# Patient Record
Sex: MALE | Race: BLACK OR AFRICAN AMERICAN | NOT HISPANIC OR LATINO | Employment: OTHER | URBAN - METROPOLITAN AREA
[De-identification: names, ages, dates, MRNs, and addresses within clinical notes are randomized per-mention and may not be internally consistent; named-entity substitution may affect disease eponyms.]

---

## 2022-10-30 ENCOUNTER — HOSPITAL ENCOUNTER (EMERGENCY)
Facility: HOSPITAL | Age: 52
Discharge: HOME | End: 2022-10-30
Attending: EMERGENCY MEDICINE
Payer: COMMERCIAL

## 2022-10-30 VITALS
DIASTOLIC BLOOD PRESSURE: 79 MMHG | TEMPERATURE: 98 F | SYSTOLIC BLOOD PRESSURE: 173 MMHG | OXYGEN SATURATION: 98 % | RESPIRATION RATE: 18 BRPM | HEART RATE: 60 BPM | WEIGHT: 292 LBS

## 2022-10-30 DIAGNOSIS — E66.01 MORBID OBESITY: ICD-10-CM

## 2022-10-30 DIAGNOSIS — I87.2 VENOUS STASIS DERMATITIS OF RIGHT LOWER EXTREMITY: Primary | ICD-10-CM

## 2022-10-30 DIAGNOSIS — I10 CHRONIC HYPERTENSION: ICD-10-CM

## 2022-10-30 PROCEDURE — 99281 EMR DPT VST MAYX REQ PHY/QHP: CPT | Mod: 25

## 2022-10-30 NOTE — ED PROVIDER NOTES
SCRIBE #1 NOTE: I, Snehal Britt, am scribing for, and in the presence of, Susan Camarillo MD. I have scribed the entire note.       History     Chief Complaint   Patient presents with    Leg Swelling     Pt. Reports leg swelling to the right leg for the last 2 weeks. Redness, swelling, and pain to the foot, and calf area.      Review of patient's allergies indicates:  No Known Allergies      History of Present Illness     HPI    10/30/2022, 12:44 PM  History obtained from the patient      History of Present Illness: Kavin Phillip is a 52 y.o. male patient who presents to the Emergency Department for evaluation of R leg swelling x2 wks. Pt reports being hospitalized for cellulitis of RLE  and fungus between toes on R foot on 09/19. Reports having an US done at this time, also on blood thinners for atrial fib. Symptoms are constant and moderate in severity. No mitigating or exacerbating factors reported. Patient denies any SOB, CP, palpitations, weakness, fever, chills, and all other sxs at this time.  No further complaints or concerns at this time.       Arrival mode: Personal vehicle      PCP: No primary care provider on file.        Past Medical History:  Past Medical History:   Diagnosis Date    Hypertension        Past Surgical History:  History reviewed. No pertinent surgical history.      Family History:  History reviewed. No pertinent family history.    Social History:  Social History     Tobacco Use    Smoking status: Not on file    Smokeless tobacco: Not on file   Substance and Sexual Activity    Alcohol use: Not on file    Drug use: Not on file    Sexual activity: Not on file        Review of Systems     Review of Systems   Constitutional:  Negative for chills and fever.   HENT:  Negative for sore throat.    Respiratory:  Negative for shortness of breath.    Cardiovascular:  Positive for leg swelling (R). Negative for chest pain.   Gastrointestinal:  Negative for abdominal pain and nausea.    Genitourinary:  Negative for dysuria.   Musculoskeletal:  Negative for back pain.   Skin:  Negative for rash.   Neurological:  Negative for weakness.   Hematological:  Does not bruise/bleed easily.   All other systems reviewed and are negative.     Physical Exam     Initial Vitals [10/30/22 1222]   BP Pulse Resp Temp SpO2   (!) 200/105 68 18 97.5 °F (36.4 °C) 98 %      MAP       --          Physical Exam  Nursing Notes and Vital Signs Reviewed.  Constitutional: Patient is in no apparent distress. Well-developed and well-nourished.  Head: Atraumatic. Normocephalic.  Eyes: PERRL. EOM intact. Conjunctivae are not pale. No scleral icterus.  ENT: Mucous membranes are moist. Oropharynx is clear and symmetric.    Neck: Supple. Full ROM. No lymphadenopathy.  Cardiovascular: Regular rate. Regular rhythm. No murmurs, rubs, or gallops. Distal pulses are 2+ and symmetric.  Pulmonary/Chest: No respiratory distress. Clear to auscultation bilaterally. No wheezing or rales.  Abdominal: Soft and non-distended.  There is no tenderness.  No rebound, guarding, or rigidity.   Musculoskeletal: Moves all extremities. No obvious deformities. Chronic veinous stasis dermatitis to R distal leg. No erythema. No warmth. R leg chronically more swollen than left leg, per patient.   Skin: Warm and dry.  Neurological:  Alert, awake, and appropriate.  Normal speech.  No acute focal neurological deficits are appreciated.  Psychiatric: Normal affect. Good eye contact. Appropriate in content.     ED Course   Procedures  ED Vital Signs:  Vitals:    10/30/22 1222 10/30/22 1249   BP: (!) 200/105 (!) 173/79   Pulse: 68 60   Resp: 18 18   Temp: 97.5 °F (36.4 °C)    TempSrc: Oral    SpO2: 98% 98%   Weight: 132.5 kg (292 lb)        Abnormal Lab Results:  Labs Reviewed - No data to display       All Lab Results:  No results found for this or any previous visit.      Imaging Results:  Imaging Results    None                 The Emergency Provider reviewed the  vital signs and test results, which are outlined above.     ED Discussion     Patient with mult ED visits and PCP visits for same over coarse of last several months and years, clinically appears to have chronic venous stasis dermatitis, there is no clinical evidence of cellulitis on exam, I do not feel US is warranted again.  Advised weight loss, low salt diet, leg elevation and compression stocks.  Placed referral into vascular surgery and PCP.       12:51 PM: Reassessed pt at this time. Discussed with pt all pertinent ED information and results. Discussed pt dx and plan of tx. Gave pt all f/u and return to the ED instructions. All questions and concerns were addressed at this time. Pt expresses understanding of information and instructions, and is comfortable with plan to discharge. Pt is stable for discharge.    I discussed with patient and/or family/caretaker that evaluation in the ED does not suggest any emergent or life threatening medical conditions requiring immediate intervention beyond what was provided in the ED, and I believe patient is safe for discharge.  Regardless, an unremarkable evaluation in the ED does not preclude the development or presence of a serious of life threatening condition. As such, patient was instructed to return immediately for any worsening or change in current symptoms.    Pt instructed to follow up with vascular surgeon.                    ED Medication(s):  Medications - No data to display    New Prescriptions    No medications on file        Follow-up Information       [facility] Internal [facility]. Schedule an appointment as soon as possible for a visit in 2 days.    Specialty: Internal Medicine  Contact information:  [address] [facility] [address] [phone]  Additional information:  Please park on the [address] side and use the Clinic entrance. Check in on the 2nd floor, to the right.             [facility]. Schedule an  appointment as soon as possible for a visit in 2 days.    Specialty: Vascular Surgery  Why: Return to the Emergency Room, If symptoms worsen  Contact information:  [address] [facility] [id number] [phone]                               Scribe Attestation:   Scribe #1: I performed the above scribed service and the documentation accurately describes the services I performed. I attest to the accuracy of the note.     Attending:   Physician Attestation Statement for Scribe #1: I, Susan Camarillo MD, personally performed the services described in this documentation, as scribed by Snehal Britt, in my presence, and it is both accurate and complete.           Clinical Impression       ICD-10-CM ICD-9-CM   1. Venous stasis dermatitis of right lower extremity  I87.2 454.1   2. Morbid obesity  E66.01 278.01   3. Chronic hypertension  I10 401.9       Disposition:   Disposition: Discharged  Condition: Stable       Susan Camarillo MD  10/30/22 0812

## 2022-10-30 NOTE — FIRST PROVIDER EVALUATION
Medical screening examination initiated.  I have conducted a focused provider triage encounter, findings are as follows:    Brief history of present illness:  RLE redness, warmth, and swelling. Hospitalized about 3 weeks ago for cellulitis at [facility]. Negative for DVT per pt. Was not on DC with PO abx. He reports fevers and chills.     Vitals:    10/30/22 1222   BP: (!) 200/105   BP Location: Right arm   Patient Position: Sitting   Pulse: 68   Resp: 18   Temp: 97.5 °F (36.4 °C)   TempSrc: Oral   SpO2: 98%   Weight: 132.5 kg (292 lb)       Pertinent physical exam:  AAOx4. Respirations even and unlabored. RLE with redness,swelling, and warmth.     Brief workup plan:  CBC. CMP, lactic acid, and blood cultures    Preliminary workup initiated; this workup will be continued and followed by the physician or advanced practice provider that is assigned to the patient when roomed.

## 2022-10-31 ENCOUNTER — TELEPHONE (OUTPATIENT)
Dept: CARDIOLOGY | Facility: CLINIC | Age: 52
End: 2022-10-31
Payer: COMMERCIAL

## 2022-10-31 ENCOUNTER — TELEPHONE (OUTPATIENT)
Dept: VASCULAR SURGERY | Facility: CLINIC | Age: 52
End: 2022-10-31
Payer: COMMERCIAL

## 2022-10-31 NOTE — TELEPHONE ENCOUNTER
Appt made for 11/2    ----- Message from Kylie Andre sent at 10/31/2022  9:04 AM CDT -----  Type:  Sooner Apoointment Request    Caller is requesting a sooner appointment.  Caller declined first available appointment listed below.  Caller will not accept being placed on the waitlist and is requesting a message be sent to doctor.  Name of Caller:patient  When is the first available appointment?na  Symptoms ER follow up for NP referral  Would the patient rather a call back or a response via [facility]? Call back  Best Call Back Number:[phone]  Additional Information: na

## 2022-10-31 NOTE — TELEPHONE ENCOUNTER
----- Message from Chikis Hua sent at 10/31/2022  9:37 AM CDT -----  Contact: Baljinder Gale needs a call back at [phone], Regards to scheduling a new patient ER follow up for swelling of the legs.    Thanks  Td

## 2022-11-02 ENCOUNTER — INITIAL CONSULT (OUTPATIENT)
Dept: VASCULAR SURGERY | Facility: CLINIC | Age: 52
End: 2022-11-02
Payer: COMMERCIAL

## 2022-11-02 VITALS
DIASTOLIC BLOOD PRESSURE: 82 MMHG | SYSTOLIC BLOOD PRESSURE: 172 MMHG | TEMPERATURE: 98 F | HEART RATE: 80 BPM | WEIGHT: 292 LBS

## 2022-11-02 DIAGNOSIS — I87.2 CHRONIC VENOUS INSUFFICIENCY OF LOWER EXTREMITY: Primary | ICD-10-CM

## 2022-11-02 PROCEDURE — 99999 PR PBB SHADOW E&M-EST. PATIENT-LVL IV: CPT | Mod: PBBFAC,,, | Performed by: SURGERY

## 2022-11-02 PROCEDURE — 99999 PR PBB SHADOW E&M-EST. PATIENT-LVL IV: ICD-10-PCS | Mod: PBBFAC,,, | Performed by: SURGERY

## 2022-11-02 PROCEDURE — 99204 OFFICE O/P NEW MOD 45 MIN: CPT | Mod: S$GLB,,, | Performed by: SURGERY

## 2022-11-02 PROCEDURE — 99204 PR OFFICE/OUTPT VISIT, NEW, LEVL IV, 45-59 MIN: ICD-10-PCS | Mod: S$GLB,,, | Performed by: SURGERY

## 2022-11-02 RX ORDER — LOSARTAN POTASSIUM 100 MG/1
1 TABLET ORAL DAILY
COMMUNITY
Start: 2022-09-19

## 2022-11-02 RX ORDER — VERAPAMIL HYDROCHLORIDE 180 MG/1
CAPSULE, EXTENDED RELEASE ORAL DAILY
COMMUNITY
Start: 2022-03-18

## 2022-11-02 RX ORDER — ATORVASTATIN CALCIUM 20 MG/1
1 TABLET, FILM COATED ORAL NIGHTLY
COMMUNITY
Start: 2022-03-29

## 2022-11-02 RX ORDER — VERAPAMIL HYDROCHLORIDE 180 MG/1
180 CAPSULE, EXTENDED RELEASE ORAL DAILY
COMMUNITY
Start: 2022-10-07

## 2022-11-02 RX ORDER — SULFAMETHOXAZOLE AND TRIMETHOPRIM 800; 160 MG/1; MG/1
1 TABLET ORAL 2 TIMES DAILY
COMMUNITY
Start: 2022-05-14

## 2022-11-02 RX ORDER — HYDROCHLOROTHIAZIDE 25 MG/1
1 TABLET ORAL DAILY
COMMUNITY
Start: 2022-09-19

## 2022-11-02 RX ORDER — CARVEDILOL 25 MG/1
50 TABLET ORAL 2 TIMES DAILY
COMMUNITY
Start: 2022-10-04

## 2022-11-02 RX ORDER — LANOLIN ALCOHOL/MO/W.PET/CERES
1 CREAM (GRAM) TOPICAL 2 TIMES DAILY
COMMUNITY
Start: 2022-10-07

## 2022-11-02 RX ORDER — HYDROCHLOROTHIAZIDE 25 MG/1
25 TABLET ORAL DAILY
COMMUNITY
Start: 2022-09-19

## 2022-11-02 RX ORDER — METRONIDAZOLE 500 MG/1
2000 TABLET ORAL
COMMUNITY
Start: 2022-08-04

## 2022-11-02 RX ORDER — CARVEDILOL 25 MG/1
25 TABLET ORAL
COMMUNITY
Start: 2022-02-02

## 2022-11-02 RX ORDER — ALBUTEROL SULFATE 90 UG/1
2 AEROSOL, METERED RESPIRATORY (INHALATION) EVERY 6 HOURS PRN
COMMUNITY
Start: 2022-02-02

## 2022-11-02 RX ORDER — CETIRIZINE HYDROCHLORIDE 10 MG/1
1 TABLET ORAL DAILY
COMMUNITY
Start: 2022-03-11

## 2022-11-02 RX ORDER — LANOLIN ALCOHOL/MO/W.PET/CERES
400 CREAM (GRAM) TOPICAL
COMMUNITY

## 2022-11-02 RX ORDER — DOXYCYCLINE 100 MG/1
100 CAPSULE ORAL 2 TIMES DAILY
COMMUNITY
Start: 2022-09-19

## 2022-11-02 RX ORDER — ATORVASTATIN CALCIUM 20 MG/1
20 TABLET, FILM COATED ORAL DAILY
COMMUNITY
Start: 2022-10-11

## 2022-11-02 RX ORDER — LOSARTAN POTASSIUM 100 MG/1
100 TABLET ORAL DAILY
COMMUNITY
Start: 2022-10-11

## 2022-11-02 RX ORDER — RIVAROXABAN 20 MG/1
TABLET, FILM COATED ORAL
COMMUNITY
Start: 2022-10-07

## 2022-11-02 NOTE — PROGRESS NOTES
[facility] Vascular Surgery  Congenital Cardiovascular Surgery  Consult Note    Patient Name: Kavin Phillip  MRN: [id number]  Admission Date: (Not on file)  Hospital Length of Stay: 0 days   Attending Physician: No att. providers found  Primary Care Provider: Primary Doctor No    Patient information was obtained from patient and ER records.     Consults  Subjective:     Chief Complaint leg swelling    History of Present Illness: 52 y.o. male patient who presents to the Emergency Department for evaluation of R leg swelling x2 wks. Pt reports being hospitalized for cellulitis of RLE  and fungus between toes on R foot on 09/19. Reports having an US done at this time, also on blood thinners for atrial fib. Symptoms are constant and moderate in severity. No mitigating or exacerbating factors reported. Patient denies any SOB, CP, palpitations, weakness, fever, chills, and all other sxs at this time.  No further complaints or concerns at this time.     11/2/22  Patient reports today for an episode of right lower extremity swelling with cellulitis.  Patient was recently admitted to the hospital for IV antibiotics proximally 2-3 weeks ago.  Since that time he has had continued leg swelling with pain and erythema.  Denies fever or chills.  Patient has been unable to tolerate compression therapy    Current Outpatient Medications   Medication    albuterol (PROVENTIL/VENTOLIN HFA) 90 mcg/actuation inhaler    atorvastatin (LIPITOR) 20 MG tablet    atorvastatin (LIPITOR) 20 MG tablet    carvediloL (COREG) 25 MG tablet    carvediloL (COREG) 25 MG tablet    hydroCHLOROthiazide (HYDRODIURIL) 25 MG tablet    hydroCHLOROthiazide (HYDRODIURIL) 25 MG tablet    losartan (COZAAR) 100 MG tablet    losartan (COZAAR) 100 MG tablet    magnesium oxide (MAG-OX) 400 mg (241.3 mg magnesium) tablet    magnesium oxide (MAG-OX) 400 mg (241.3 mg magnesium) tablet    verapamiL (VERELAN) 180 MG C24P    verapamiL (VERELAN) 180 MG C24P    XARELTO 20  mg Tab    cetirizine (ZYRTEC) 10 MG tablet    doxycycline (VIBRAMYCIN) 100 MG Cap    metroNIDAZOLE (FLAGYL) 500 MG tablet    rivaroxaban (XARELTO) 20 mg Tab    sulfamethoxazole-trimethoprim 800-160mg (BACTRIM DS) 800-160 mg Tab     No current facility-administered medications for this visit.       Review of patient's allergies indicates:  No Known Allergies    Past Medical History:   Diagnosis Date    Hypertension      No past surgical history on file.  Family History    None       Tobacco Use    Smoking status: Not on file    Smokeless tobacco: Not on file   Substance and Sexual Activity    Alcohol use: Not on file    Drug use: Not on file    Sexual activity: Not on file     Review of Systems   Constitutional:  Negative for activity change, appetite change, fatigue and fever.   HENT:  Negative for congestion.    Eyes:  Negative for photophobia, redness and visual disturbance.   Respiratory:  Negative for apnea, cough, chest tightness and shortness of breath.    Cardiovascular:  Positive for leg swelling. Negative for chest pain.   Gastrointestinal:  Negative for abdominal pain, nausea and vomiting.   Genitourinary:  Negative for difficulty urinating.   Musculoskeletal:  Negative for gait problem and myalgias.   Skin:  Negative for color change, rash and wound.   Neurological:  Negative for syncope, facial asymmetry, speech difficulty, weakness and numbness.   Objective:     Vital Signs (Most Recent):  Temp: 98.4 °F (36.9 °C) (11/02/22 0829)  Pulse: 80 (11/02/22 0829)  BP: (!) 172/82 (11/02/22 0829)   Vital Signs (24h Range):  [unfilled]     Weight: 132.5 kg (292 lb)  There is no height or weight on file to calculate BMI.            [unfilled]    Physical Exam  Constitutional:       Appearance: Normal appearance. He is normal weight.   HENT:      Head: Normocephalic and atraumatic.      Mouth/Throat:      Mouth: Mucous membranes are moist.   Eyes:      Extraocular Movements: Extraocular movements intact.       Conjunctiva/sclera: Conjunctivae normal.      Pupils: Pupils are equal, round, and reactive to light.   Neck:      Vascular: No carotid bruit.   Cardiovascular:      Rate and Rhythm: Normal rate and regular rhythm.      Pulses: Normal pulses.   Pulmonary:      Effort: Pulmonary effort is normal.      Breath sounds: Normal breath sounds.   Abdominal:      General: Abdomen is flat. Bowel sounds are normal.      Palpations: Abdomen is soft.   Musculoskeletal:      Cervical back: Neck supple.      Right lower le+ Edema present.      Left lower le+ Edema present.   Skin:     General: Skin is warm.      Capillary Refill: Capillary refill takes less than 2 seconds.   Neurological:      General: No focal deficit present.      Mental Status: He is alert and oriented to person, place, and time. Mental status is at baseline.      Cranial Nerves: No cranial nerve deficit.      Sensory: No sensory deficit.      Motor: No weakness.   Psychiatric:         Mood and Affect: Mood normal.         Behavior: Behavior normal.       Significant Labs:  I have reviewed all pertinent lab results within the past 24 hours.    Significant Diagnostics:  I have reviewed all pertinent imaging results/findings within the past 24 hours.    Assessment/Plan:     There are no hospital problems to display for this patient.      Bilateral lower extremity edema with inflammation and pain.  Patient is unable to tolerate compression therapy at this time.  Will obtain chronic venous ultrasound to evaluate for reflux.  Referred to lymphedema clinic    Thank you for your consult. I will follow-up with patient. Please contact us if you have any additional questions.    Rio Carpenter IV, MD  Vascular Surgery  [facility] Vascular Surgery